# Patient Record
Sex: MALE | Race: WHITE | Employment: FULL TIME | URBAN - METROPOLITAN AREA
[De-identification: names, ages, dates, MRNs, and addresses within clinical notes are randomized per-mention and may not be internally consistent; named-entity substitution may affect disease eponyms.]

---

## 2017-08-23 ENCOUNTER — OFFICE VISIT (OUTPATIENT)
Dept: FAMILY MEDICINE | Facility: CLINIC | Age: 22
End: 2017-08-23
Payer: COMMERCIAL

## 2017-08-23 VITALS
SYSTOLIC BLOOD PRESSURE: 124 MMHG | WEIGHT: 167 LBS | HEART RATE: 58 BPM | DIASTOLIC BLOOD PRESSURE: 80 MMHG | OXYGEN SATURATION: 99 % | TEMPERATURE: 97.7 F

## 2017-08-23 DIAGNOSIS — K30 INDIGESTION: ICD-10-CM

## 2017-08-23 DIAGNOSIS — R19.7 DIARRHEA, UNSPECIFIED TYPE: Primary | ICD-10-CM

## 2017-08-23 LAB
ERYTHROCYTE [DISTWIDTH] IN BLOOD BY AUTOMATED COUNT: 12.6 % (ref 10–15)
ERYTHROCYTE [SEDIMENTATION RATE] IN BLOOD BY WESTERGREN METHOD: 3 MM/H (ref 0–15)
HCT VFR BLD AUTO: 46.4 % (ref 40–53)
HGB BLD-MCNC: 15.3 G/DL (ref 13.3–17.7)
MCH RBC QN AUTO: 27.7 PG (ref 26.5–33)
MCHC RBC AUTO-ENTMCNC: 33 G/DL (ref 31.5–36.5)
MCV RBC AUTO: 84 FL (ref 78–100)
PLATELET # BLD AUTO: 248 10E9/L (ref 150–450)
RBC # BLD AUTO: 5.52 10E12/L (ref 4.4–5.9)
TSH SERPL DL<=0.005 MIU/L-ACNC: 1.75 MU/L (ref 0.4–4)
WBC # BLD AUTO: 4.3 10E9/L (ref 4–11)

## 2017-08-23 PROCEDURE — 36415 COLL VENOUS BLD VENIPUNCTURE: CPT | Performed by: FAMILY MEDICINE

## 2017-08-23 PROCEDURE — 83516 IMMUNOASSAY NONANTIBODY: CPT | Performed by: FAMILY MEDICINE

## 2017-08-23 PROCEDURE — 84443 ASSAY THYROID STIM HORMONE: CPT | Performed by: FAMILY MEDICINE

## 2017-08-23 PROCEDURE — 85652 RBC SED RATE AUTOMATED: CPT | Performed by: FAMILY MEDICINE

## 2017-08-23 PROCEDURE — 83516 IMMUNOASSAY NONANTIBODY: CPT | Mod: 59 | Performed by: FAMILY MEDICINE

## 2017-08-23 PROCEDURE — 99214 OFFICE O/P EST MOD 30 MIN: CPT | Performed by: FAMILY MEDICINE

## 2017-08-23 PROCEDURE — 85027 COMPLETE CBC AUTOMATED: CPT | Performed by: FAMILY MEDICINE

## 2017-08-23 ASSESSMENT — ANXIETY QUESTIONNAIRES
GAD7 TOTAL SCORE: 2
1. FEELING NERVOUS, ANXIOUS, OR ON EDGE: SEVERAL DAYS
IF YOU CHECKED OFF ANY PROBLEMS ON THIS QUESTIONNAIRE, HOW DIFFICULT HAVE THESE PROBLEMS MADE IT FOR YOU TO DO YOUR WORK, TAKE CARE OF THINGS AT HOME, OR GET ALONG WITH OTHER PEOPLE: SOMEWHAT DIFFICULT
6. BECOMING EASILY ANNOYED OR IRRITABLE: NOT AT ALL
5. BEING SO RESTLESS THAT IT IS HARD TO SIT STILL: NOT AT ALL
7. FEELING AFRAID AS IF SOMETHING AWFUL MIGHT HAPPEN: NOT AT ALL
2. NOT BEING ABLE TO STOP OR CONTROL WORRYING: NOT AT ALL
3. WORRYING TOO MUCH ABOUT DIFFERENT THINGS: SEVERAL DAYS

## 2017-08-23 ASSESSMENT — PATIENT HEALTH QUESTIONNAIRE - PHQ9
5. POOR APPETITE OR OVEREATING: NOT AT ALL
SUM OF ALL RESPONSES TO PHQ QUESTIONS 1-9: 0

## 2017-08-23 NOTE — LETTER
August 24, 2017      Satinder Plummer  [address]   [facility] [address]        Dear ,        I have reviewed your recent labs. Here are the results:     -TSH (thyroid stimulating hormone) level is normal which indicates normal thyroid function.   -Normal red blood cell (hgb) levels, normal white blood cell count and normal platelet levels.   ESR, test for inflammation is normal.   Test for celiac diease is also normal.   All labs are normal, if symptoms worsen over time, please follow up.     Resulted Orders   CBC with platelets   Result Value Ref Range    WBC 4.3 4.0 - 11.0 10e9/L    RBC Count 5.52 4.4 - 5.9 10e12/L    Hemoglobin 15.3 13.3 - 17.7 g/dL    Hematocrit 46.4 40.0 - 53.0 %    MCV 84 78 - 100 fl    MCH 27.7 26.5 - 33.0 pg    MCHC 33.0 31.5 - 36.5 g/dL    RDW 12.6 10.0 - 15.0 %    Platelet Count 248 150 - 450 10e9/L   TSH   Result Value Ref Range    TSH 1.75 0.40 - 4.00 mU/L   ESR: Erythrocyte sedimentation rate   Result Value Ref Range    Sed Rate 3 0 - 15 mm/h   Tissue transglutaminase yuki IgA and IgG   Result Value Ref Range    Tissue Transglutaminase Antibody IgA 1 <7 U/mL      Comment:      Negative  The tTG-IgA assay has limited utility for patients with decreased levels of   IgA. Screening for celiac disease should include IgA testing to rule out   selective IgA deficiency and to guide selection and interpretation of   serological testing. tTG-IgG testing may be positive in celiac disease   patients with IgA deficiency.      Tissue Transglutaminase Yuki IgG <1 <7 U/mL      Comment:      Negative       If you have any questions or concerns, please call the clinic at the number listed above.       Sincerely,    Andrew Pike MD

## 2017-08-23 NOTE — PROGRESS NOTES
SUBJECTIVE:   Satinder Plummer is a 22 year old male who presents to clinic today for the following health issues:      Diarrhea  Onset: 2 + weeks     Has underline anxiety issues. Denies any fever, chills.    No abdominal pain, no cramping.    Was traveling 2 weeks ago.     No nausea or vomiting.    Description:   Consistency of stool: runny and loose  Blood in stool: no   Number of loose stools in past 24 hours: 4-5    Progression of Symptoms:  worsening    Accompanying Signs & Symptoms:  Fever: no   Nausea or vomiting; no   Abdominal pain: no   Episodes of constipation: no   Weight loss: no     History:   Ill contacts: no   Recent use of antibiotics: no    Recent travels: no          Recent medication-new or changes(Rx or OTC): no       Therapies Tried and outcome:  Imodium AD                      Problem list and histories reviewed & adjusted, as indicated.      Patient Active Problem List   Diagnosis     Sprain of neck     No past surgical history on file.    Social History   Substance Use Topics     Smoking status: Never Smoker     Smokeless tobacco: Never Used     Alcohol use No     No family history on file.      Current Outpatient Prescriptions   Medication Sig Dispense Refill     sertraline (ZOLOFT) 50 MG tablet Take 75 mg by mouth       MELATONIN PO        ALLEGRA OR once daily       RHINOCORT NA once daily           Reviewed and updated as needed this visit by clinical staffTobacco  Allergies  Meds       Reviewed and updated as needed this visit by Provider         ROS:  C: NEGATIVE for fever, chills, change in weight  E/M: NEGATIVE for ear, mouth and throat problems  R: NEGATIVE for significant cough or SOB  CV: NEGATIVE for chest pain, palpitations or peripheral edema  GI: POSITIVE for diarrhea    OBJECTIVE:                                                    /80  Pulse 58  Temp 97.7  F (36.5  C) (Tympanic)  Wt 167 lb (75.8 kg)  SpO2 99%  There is no height or weight on file to calculate  BMI.   GENERAL: healthy, alert, well nourished, well hydrated, no distress  RESP: lungs clear to auscultation - no rales, no rhonchi, no wheezes  CV: regular rates and rhythm, normal S1 S2, no S3 or S4 and no murmur, no click or rub -  ABDOMEN: soft, no tenderness, no  hepatosplenomegaly, no masses, normal bowel sounds       ASSESSMENT/PLAN:                                                        ICD-10-CM    1. Diarrhea, unspecified type R19.7 CBC with platelets     TSH     ESR: Erythrocyte sedimentation rate     Tissue transglutaminase yuki IgA and IgG   2. Indigestion K30        Patient clinical exam is normal. No abdominal discomfort.  Symptoms could be underline Irritable bowel syndrome. We will do some labs and will follow up on that.  Dicussed indigestion symptoms, dicussed eliminate few foods in diet like diary and starch to see if it helps with his underline GI symptoms.      Andrew Pike MD  [facility] [place]

## 2017-08-23 NOTE — NURSING NOTE
"Chief Complaint   Patient presents with     Diarrhea       Initial /80  Pulse 58  Temp 97.7  F (36.5  C) (Tympanic)  Wt 167 lb (75.8 kg)  SpO2 99% Estimated body mass index is 18.36 kg/(m^2) as calculated from the following:    Height as of 9/27/08: 5' 2.5\" (1.588 m).    Weight as of 9/27/08: 102 lb (46.3 kg).  Medication Reconciliation: complete    Current Outpatient Prescriptions   Medication Sig Dispense Refill     sertraline (ZOLOFT) 50 MG tablet Take 75 mg by mouth       MELATONIN PO        ketoconazole (NIZORAL) 2 % cream Apply topically 2 times daily (Patient not taking: Reported on 8/23/2017) 30 g 0     triamcinolone (KENALOG) 0.1 % cream Apply sparingly to affected area three times daily. (Steroid). (Patient not taking: Reported on 8/23/2017) 30 g 0     ALLEGRA OR once daily       RHINOCORT NA once daily         Flavio BLACK CMA  "

## 2017-08-23 NOTE — MR AVS SNAPSHOT
"              After Visit Summary   2017    Satinder Plummer    MRN: [id number]           Patient Information     Date Of Birth          1995        Visit Information        Provider Department      2017 9:20 AM Andrew Pike MD [facility] [place]        Today's Diagnoses     Diarrhea, unspecified type    -  1    Indigestion           Follow-ups after your visit        Who to contact     If you have questions or need follow up information about today's clinic visit or your schedule please contact [facility] directly at [phone].  Normal or non-critical lab and imaging results will be communicated to you by [employer], letter or phone within 4 business days after the clinic has received the results. If you do not hear from us within 7 days, please contact the clinic through [employer] or phone. If you have a critical or abnormal lab result, we will notify you by phone as soon as possible.  Submit refill requests through [employer] or call your pharmacy and they will forward the refill request to us. Please allow 3 business days for your refill to be completed.          Additional Information About Your Visit        MyChart Information     [employer] lets you send messages to your doctor, view your test results, renew your prescriptions, schedule appointments and more. To sign up, go to [email or web address] . Click on \"Log in\" on the left side of the screen, which will take you to the Welcome page. Then click on \"Sign up Now\" on the right side of the page.     You will be asked to enter the access code listed below, as well as some personal information. Please follow the directions to create your username and password.     Your access code is: [email or web address]  Expires: 2017  9:53 AM     Your access code will  in 90 days. If you need help or a new code, please call your [facility] or [phone].        Care EveryWhere ID     This is your Care EveryWhere ID. This " could be used by other organizations to access your [facility] medical records  [id number]        Your Vitals Were     Pulse Temperature Pulse Oximetry             58 97.7  F (36.5  C) (Tympanic) 99%          Blood Pressure from Last 3 Encounters:   08/23/17 124/80   10/24/14 128/71   09/22/14 119/70    Weight from Last 3 Encounters:   08/23/17 167 lb (75.8 kg)   10/24/14 163 lb (73.9 kg)   09/22/14 162 lb 6 oz (73.7 kg)              We Performed the Following     CBC with platelets     ESR: Erythrocyte sedimentation rate     Tissue transglutaminase yuki IgA and IgG     TSH          Today's Medication Changes          These changes are accurate as of: 8/23/17  9:53 AM.  If you have any questions, ask your nurse or doctor.               Stop taking these medicines if you haven't already. Please contact your care team if you have questions.     ketoconazole 2 % cream   Commonly known as:  NIZORAL   Stopped by:  Andrew Pike MD           triamcinolone 0.1 % cream   Commonly known as:  KENALOG   Stopped by:  Andrew Pike MD                    Primary Care Provider    Physician No Ref-Primary       No address on file        Equal Access to Services     [facility]: Hadii racheal miller Sojose, waaxda luqadaha, qaybta kaalmada adeegyada, johnathon hong . So [facility] [phone].    ATENCIÓN: Si habla español, tiene a leon disposición servicios gratuitos de asistencia lingüística. Llame al [phone].    We comply with applicable federal civil rights laws and [place] laws. We do not discriminate on the basis of race, color, national origin, age, disability sex, sexual orientation or gender identity.            Thank you!     Thank you for choosing [facility]  for your care. Our goal is always to provide you with excellent care. Hearing back from our patients is one way we can continue to improve our services. Please take a few minutes to complete the written survey that you  may receive in the mail after your visit with us. Thank you!             Your Updated Medication List - Protect others around you: Learn how to safely use, store and throw away your medicines at www.disposemymeds.org.          This list is accurate as of: 8/23/17  9:53 AM.  Always use your most recent med list.                   Brand Name Dispense Instructions for use Diagnosis    ALLEGRA PO      once daily        MELATONIN PO           RHINOCORT NA      once daily        sertraline 50 MG tablet    ZOLOFT     Take 75 mg by mouth

## 2017-08-24 LAB
TTG IGA SER-ACNC: 1 U/ML
TTG IGG SER-ACNC: <1 U/ML

## 2017-08-24 ASSESSMENT — ANXIETY QUESTIONNAIRES: GAD7 TOTAL SCORE: 2
